# Patient Record
Sex: MALE | Race: WHITE | Employment: FULL TIME | URBAN - METROPOLITAN AREA
[De-identification: names, ages, dates, MRNs, and addresses within clinical notes are randomized per-mention and may not be internally consistent; named-entity substitution may affect disease eponyms.]

---

## 2017-12-05 ENCOUNTER — APPOINTMENT (OUTPATIENT)
Dept: GENERAL RADIOLOGY | Facility: CLINIC | Age: 47
End: 2017-12-05
Payer: COMMERCIAL

## 2017-12-05 ENCOUNTER — HOSPITAL ENCOUNTER (EMERGENCY)
Facility: CLINIC | Age: 47
Discharge: HOME | End: 2017-12-05
Attending: SPECIALIST
Payer: COMMERCIAL

## 2017-12-05 VITALS
HEART RATE: 88 BPM | DIASTOLIC BLOOD PRESSURE: 112 MMHG | SYSTOLIC BLOOD PRESSURE: 154 MMHG | WEIGHT: 200 LBS | OXYGEN SATURATION: 97 % | BODY MASS INDEX: 29.62 KG/M2 | TEMPERATURE: 98.8 F | RESPIRATION RATE: 18 BRPM | HEIGHT: 69 IN

## 2017-12-05 DIAGNOSIS — S86.001A INJURY OF RIGHT ACHILLES TENDON, INITIAL ENCOUNTER: Primary | ICD-10-CM

## 2017-12-05 PROCEDURE — 6370000000 HC RX 637 (ALT 250 FOR IP): Performed by: SPECIALIST

## 2017-12-05 PROCEDURE — 99283 EMERGENCY DEPT VISIT LOW MDM: CPT

## 2017-12-05 PROCEDURE — 29515 APPLICATION SHORT LEG SPLINT: CPT

## 2017-12-05 PROCEDURE — 73590 X-RAY EXAM OF LOWER LEG: CPT

## 2017-12-05 RX ORDER — IBUPROFEN 800 MG/1
800 TABLET ORAL EVERY 8 HOURS PRN
Qty: 20 TABLET | Refills: 0 | Status: SHIPPED | OUTPATIENT
Start: 2017-12-05 | End: 2022-08-15 | Stop reason: SDUPTHER

## 2017-12-05 RX ORDER — ACETAMINOPHEN 325 MG/1
650 TABLET ORAL ONCE
Status: COMPLETED | OUTPATIENT
Start: 2017-12-05 | End: 2017-12-05

## 2017-12-05 RX ORDER — IBUPROFEN 600 MG/1
600 TABLET ORAL ONCE
Status: COMPLETED | OUTPATIENT
Start: 2017-12-05 | End: 2017-12-05

## 2017-12-05 RX ADMIN — ACETAMINOPHEN 650 MG: 325 TABLET ORAL at 09:46

## 2017-12-05 RX ADMIN — IBUPROFEN 600 MG: 600 TABLET, FILM COATED ORAL at 09:46

## 2017-12-05 ASSESSMENT — PAIN DESCRIPTION - ORIENTATION: ORIENTATION: RIGHT

## 2017-12-05 ASSESSMENT — PAIN DESCRIPTION - FREQUENCY: FREQUENCY: CONTINUOUS

## 2017-12-05 ASSESSMENT — PAIN DESCRIPTION - ONSET: ONSET: SUDDEN

## 2017-12-05 ASSESSMENT — PAIN SCALES - WONG BAKER: WONGBAKER_NUMERICALRESPONSE: 10

## 2017-12-05 ASSESSMENT — PAIN DESCRIPTION - PROGRESSION: CLINICAL_PROGRESSION: GRADUALLY WORSENING

## 2017-12-05 ASSESSMENT — PAIN DESCRIPTION - PAIN TYPE: TYPE: ACUTE PAIN

## 2017-12-05 ASSESSMENT — PAIN DESCRIPTION - DESCRIPTORS: DESCRIPTORS: THROBBING

## 2017-12-05 ASSESSMENT — PAIN SCALES - GENERAL: PAINLEVEL_OUTOF10: 7

## 2017-12-05 NOTE — ED NOTES
Calling Dr. Nikhil Rosario (on call for orthopedics) [phone]. Speaking over the phone at this time.      Merna Solorio RN  12/05/17 7828

## 2017-12-05 NOTE — ED NOTES
Patient came back in wheelchair to ER. Patient states around 0715 am at work he was standing by a row of semi-tires that weigh 200lbs a piece when the keesha on the other end knocked them over and a frances affect occurred and landed on his right ankle area. Patient has significant swelling to the right achilles tendon. Patient is unable to ambulate and cannot flex his foot.      Shannan Dawson RN  12/05/17 1692

## 2017-12-05 NOTE — ED PROVIDER NOTES
surgical history that includes Tonsillectomy and Tonsillectomy. CURRENT MEDICATIONS       Discharge Medication List as of 12/5/2017 11:24 AM      CONTINUE these medications which have NOT CHANGED    Details   omeprazole (PRILOSEC) 20 MG capsule Take 40 mg by mouth daily      meclizine (ANTIVERT) 25 MG tablet Take 1 tablet by mouth 3 times daily as needed, Disp-20 tablet, R-0             ALLERGIES     is allergic to penicillins. FAMILY HISTORY     has no family status information on file. family history is not on file. SOCIAL HISTORY      reports that he has quit smoking. He has never used smokeless tobacco. He reports that he drinks about 1.8 oz of alcohol per week . He reports that he does not use drugs. PHYSICAL EXAM     INITIAL VITALS:  height is 5' 9\" (1.753 m) and weight is 200 lb (90.7 kg). His oral temperature is 98.8 °F (37.1 °C). His blood pressure is 154/112 (abnormal) and his pulse is 88. His respiration is 18 and oxygen saturation is 97%. Physical Exam   Constitutional: He is oriented to person, place, and time. He appears well-developed and well-nourished. HENT:   Head: Normocephalic and atraumatic. Nose: Nose normal.   Mouth/Throat: Oropharynx is clear and moist.   Eyes: EOM are normal. Pupils are equal, round, and reactive to light. Neck: Normal range of motion. Neck supple. Cardiovascular: Normal rate, regular rhythm, normal heart sounds and intact distal pulses. No murmur heard. Pulmonary/Chest: Effort normal and breath sounds normal. No respiratory distress. Abdominal: Soft. Bowel sounds are normal. He exhibits no distension. There is no tenderness. Musculoskeletal:        Right ankle: He exhibits decreased range of motion and swelling. He exhibits normal pulse. No lateral malleolus, no medial malleolus and no head of 5th metatarsal tenderness found. Achilles tendon exhibits pain and abnormal Hammer's test results.    Neurological: He is alert and oriented to department course, ice packs were applied locally and the patient was given Tylenol 650 mg and Motrin 600 mg orally. Patient states he is feeling much better and able to move his ankle somewhat more freely. He continues to have limited plantar flexion with [place] test.  Posterior short leg splint was applied with the foot in plantar flexion. Please see procedure note. Crutches were supplied. I discussed the case with Dr. Rl Chaves, orthopedic surgeon on call and the patient was advised to follow-up with Dr. Rl Chaves in 2 days. He is advised to call [phone] for appointment. He is advised to return if worse. I have reviewed the disposition diagnosis with the patient and or their family/guardian. I have answered their questions and given discharge instructions. They voiced understanding of these instructions and did not have any further questions or complaints. Re-evaluation Notes    Upon reevaluation, patient is resting comfortably and does not appear to be in any pain or distress. PROCEDURES:  Splint Application  Date/Time: 12/6/2017 8:54 AM  Performed by: Eveline Reyna  Authorized by: Eveline Reyna     Consent:     Consent obtained:  Verbal    Consent given by:  Patient    Risks discussed:  Discoloration and numbness    Alternatives discussed:  No treatment, alternative treatment, observation and referral  Pre-procedure details:     Sensation:  Normal  Procedure details:     Laterality:  Right    Location:  Ankle    Ankle:  R ankle    Strapping: yes      Splint type:  Short leg    Supplies:  Cotton padding, elastic bandage and Ortho-Glass  Post-procedure details:     Pain:  Improved    Sensation:  Normal    Patient tolerance of procedure: Tolerated well, no immediate complications        FINAL IMPRESSION      1.  Injury of right Achilles tendon, initial encounter          DISPOSITION/PLAN   DISPOSITION Decision to Discharge    Condition on Disposition    Stable    PATIENT REFERRED TO:  Dionisio Davis

## 2017-12-06 ASSESSMENT — ENCOUNTER SYMPTOMS: BACK PAIN: 0

## 2022-01-31 ENCOUNTER — HOSPITAL ENCOUNTER (OUTPATIENT)
Age: 52
Setting detail: SPECIMEN
Discharge: HOME | End: 2022-01-31

## 2022-01-31 ENCOUNTER — OFFICE VISIT (OUTPATIENT)
Dept: FAMILY MEDICINE CLINIC | Age: 52
End: 2022-01-31
Payer: COMMERCIAL

## 2022-01-31 VITALS
BODY MASS INDEX: 30.16 KG/M2 | OXYGEN SATURATION: 96 % | RESPIRATION RATE: 20 BRPM | HEIGHT: 69 IN | HEART RATE: 83 BPM | SYSTOLIC BLOOD PRESSURE: 138 MMHG | DIASTOLIC BLOOD PRESSURE: 88 MMHG | WEIGHT: 203.6 LBS

## 2022-01-31 DIAGNOSIS — Z13.29 SCREENING FOR THYROID DISORDER: ICD-10-CM

## 2022-01-31 DIAGNOSIS — Z13.220 SCREENING FOR HYPERLIPIDEMIA: ICD-10-CM

## 2022-01-31 DIAGNOSIS — F12.90 MARIJUANA USE: ICD-10-CM

## 2022-01-31 DIAGNOSIS — Z11.4 SCREENING FOR HIV (HUMAN IMMUNODEFICIENCY VIRUS): ICD-10-CM

## 2022-01-31 DIAGNOSIS — Z13.0 SCREENING FOR DEFICIENCY ANEMIA: ICD-10-CM

## 2022-01-31 DIAGNOSIS — Z76.89 ENCOUNTER TO ESTABLISH CARE: Primary | ICD-10-CM

## 2022-01-31 DIAGNOSIS — I10 HYPERTENSION, UNSPECIFIED TYPE: ICD-10-CM

## 2022-01-31 DIAGNOSIS — Z12.5 SCREENING FOR PROSTATE CANCER: ICD-10-CM

## 2022-01-31 DIAGNOSIS — E11.9 TYPE 2 DIABETES MELLITUS WITHOUT COMPLICATION, WITHOUT LONG-TERM CURRENT USE OF INSULIN (HCC): ICD-10-CM

## 2022-01-31 DIAGNOSIS — Z11.59 ENCOUNTER FOR HEPATITIS C SCREENING TEST FOR LOW RISK PATIENT: ICD-10-CM

## 2022-01-31 DIAGNOSIS — K21.9 GASTROESOPHAGEAL REFLUX DISEASE WITHOUT ESOPHAGITIS: ICD-10-CM

## 2022-01-31 LAB
ABSOLUTE EOS #: 0.23 K/UL (ref 0–0.44)
ABSOLUTE IMMATURE GRANULOCYTE: 0.03 K/UL (ref 0–0.3)
ABSOLUTE LYMPH #: 2.17 K/UL (ref 1.1–3.7)
ABSOLUTE MONO #: 0.68 K/UL (ref 0.1–1.2)
ALBUMIN SERPL-MCNC: 4.8 G/DL (ref 3.5–5.2)
ALBUMIN/GLOBULIN RATIO: 2 (ref 1–2.5)
ALP BLD-CCNC: 47 U/L (ref 40–129)
ALT SERPL-CCNC: 23 U/L (ref 5–41)
ANION GAP SERPL CALCULATED.3IONS-SCNC: 14 MMOL/L (ref 9–17)
AST SERPL-CCNC: 17 U/L
BASOPHILS # BLD: 1 % (ref 0–2)
BASOPHILS ABSOLUTE: 0.06 K/UL (ref 0–0.2)
BILIRUB SERPL-MCNC: <0.1 MG/DL (ref 0.3–1.2)
BUN BLDV-MCNC: 19 MG/DL (ref 6–20)
BUN/CREAT BLD: ABNORMAL (ref 9–20)
CALCIUM SERPL-MCNC: 9.9 MG/DL (ref 8.6–10.4)
CHLORIDE BLD-SCNC: 100 MMOL/L (ref 98–107)
CHOLESTEROL/HDL RATIO: 3.2
CHOLESTEROL: 191 MG/DL
CO2: 25 MMOL/L (ref 20–31)
CREAT SERPL-MCNC: 0.7 MG/DL (ref 0.7–1.2)
DIFFERENTIAL TYPE: NORMAL
EOSINOPHILS RELATIVE PERCENT: 3 % (ref 1–4)
GFR AFRICAN AMERICAN: >60 ML/MIN
GFR NON-AFRICAN AMERICAN: >60 ML/MIN
GFR SERPL CREATININE-BSD FRML MDRD: ABNORMAL ML/MIN/{1.73_M2}
GFR SERPL CREATININE-BSD FRML MDRD: ABNORMAL ML/MIN/{1.73_M2}
GLUCOSE BLD-MCNC: 85 MG/DL (ref 70–99)
HBA1C MFR BLD: 5.5 %
HCT VFR BLD CALC: 46.9 % (ref 40.7–50.3)
HDLC SERPL-MCNC: 60 MG/DL
HEMOGLOBIN: 15.6 G/DL (ref 13–17)
HEPATITIS C ANTIBODY: NONREACTIVE
HIV AG/AB: NONREACTIVE
IMMATURE GRANULOCYTES: 0 %
LDL CHOLESTEROL: 86 MG/DL (ref 0–130)
LYMPHOCYTES # BLD: 29 % (ref 24–43)
MCH RBC QN AUTO: 28.8 PG (ref 25.2–33.5)
MCHC RBC AUTO-ENTMCNC: 33.3 G/DL (ref 28.4–34.8)
MCV RBC AUTO: 86.5 FL (ref 82.6–102.9)
MONOCYTES # BLD: 9 % (ref 3–12)
NRBC AUTOMATED: 0 PER 100 WBC
PDW BLD-RTO: 12.7 % (ref 11.8–14.4)
PLATELET # BLD: 193 K/UL (ref 138–453)
PLATELET ESTIMATE: NORMAL
PMV BLD AUTO: 12.7 FL (ref 8.1–13.5)
POTASSIUM SERPL-SCNC: 4.5 MMOL/L (ref 3.7–5.3)
PROSTATE SPECIFIC ANTIGEN: 1.35 UG/L
RBC # BLD: 5.42 M/UL (ref 4.21–5.77)
RBC # BLD: NORMAL 10*6/UL
SEG NEUTROPHILS: 58 % (ref 36–65)
SEGMENTED NEUTROPHILS ABSOLUTE COUNT: 4.31 K/UL (ref 1.5–8.1)
SODIUM BLD-SCNC: 139 MMOL/L (ref 135–144)
TOTAL PROTEIN: 7.2 G/DL (ref 6.4–8.3)
TRIGL SERPL-MCNC: 223 MG/DL
TSH SERPL DL<=0.05 MIU/L-ACNC: 0.45 MIU/L (ref 0.3–5)
VLDLC SERPL CALC-MCNC: ABNORMAL MG/DL (ref 1–30)
WBC # BLD: 7.5 K/UL (ref 3.5–11.3)
WBC # BLD: NORMAL 10*3/UL

## 2022-01-31 PROCEDURE — 83036 HEMOGLOBIN GLYCOSYLATED A1C: CPT | Performed by: NURSE PRACTITIONER

## 2022-01-31 PROCEDURE — 99203 OFFICE O/P NEW LOW 30 MIN: CPT | Performed by: NURSE PRACTITIONER

## 2022-01-31 RX ORDER — OMEPRAZOLE 20 MG/1
20 CAPSULE, DELAYED RELEASE ORAL DAILY
COMMUNITY

## 2022-01-31 RX ORDER — VALACYCLOVIR HYDROCHLORIDE 1 G/1
1000 TABLET, FILM COATED ORAL 2 TIMES DAILY
COMMUNITY

## 2022-01-31 RX ORDER — LISINOPRIL 10 MG/1
10 TABLET ORAL DAILY
Qty: 30 TABLET | Refills: 1 | Status: SHIPPED | OUTPATIENT
Start: 2022-01-31 | End: 2022-02-28 | Stop reason: SDUPTHER

## 2022-01-31 SDOH — ECONOMIC STABILITY: FOOD INSECURITY: WITHIN THE PAST 12 MONTHS, THE FOOD YOU BOUGHT JUST DIDN'T LAST AND YOU DIDN'T HAVE MONEY TO GET MORE.: NEVER TRUE

## 2022-01-31 SDOH — ECONOMIC STABILITY: FOOD INSECURITY: WITHIN THE PAST 12 MONTHS, YOU WORRIED THAT YOUR FOOD WOULD RUN OUT BEFORE YOU GOT MONEY TO BUY MORE.: NEVER TRUE

## 2022-01-31 ASSESSMENT — ENCOUNTER SYMPTOMS
COUGH: 1
SINUS PAIN: 0
COLOR CHANGE: 0
SINUS PRESSURE: 0
BACK PAIN: 0
DIARRHEA: 0
NAUSEA: 0
EYE PAIN: 0
SHORTNESS OF BREATH: 1
CHEST TIGHTNESS: 0
ABDOMINAL PAIN: 0
CONSTIPATION: 0
VOMITING: 0

## 2022-01-31 ASSESSMENT — COLUMBIA-SUICIDE SEVERITY RATING SCALE - C-SSRS
6. HAVE YOU EVER DONE ANYTHING, STARTED TO DO ANYTHING, OR PREPARED TO DO ANYTHING TO END YOUR LIFE?: NO
1. WITHIN THE PAST MONTH, HAVE YOU WISHED YOU WERE DEAD OR WISHED YOU COULD GO TO SLEEP AND NOT WAKE UP?: NO
2. HAVE YOU ACTUALLY HAD ANY THOUGHTS OF KILLING YOURSELF?: NO

## 2022-01-31 ASSESSMENT — SOCIAL DETERMINANTS OF HEALTH (SDOH): HOW HARD IS IT FOR YOU TO PAY FOR THE VERY BASICS LIKE FOOD, HOUSING, MEDICAL CARE, AND HEATING?: NOT HARD AT ALL

## 2022-01-31 ASSESSMENT — PATIENT HEALTH QUESTIONNAIRE - PHQ9
1. LITTLE INTEREST OR PLEASURE IN DOING THINGS: 0
2. FEELING DOWN, DEPRESSED OR HOPELESS: 0
SUM OF ALL RESPONSES TO PHQ QUESTIONS 1-9: 0
SUM OF ALL RESPONSES TO PHQ9 QUESTIONS 1 & 2: 0

## 2022-01-31 NOTE — ASSESSMENT & PLAN NOTE
Unclear control, changes made today: start lisinopril, medication adherence emphasized and lifestyle modifications recommended Follow up in 1 month. Maintain a healthy weight and BMI, incorporate regular aerobic exercise and focus on stress management. Adhere to medication regimen, reduce dietary sodium and saturated fat intake - increase fruits, vegetables and whole grains to diet. When self-monitoring blood pressure use reliable instruments; measure blood pressure at least once a week and after any changes made to medication therapy. Bring in an average of home readings on two or more occasions for office visits.

## 2022-01-31 NOTE — PROGRESS NOTES
Henriette Frankel (:  1970) is a 54 y.o. male,New patient, here for evaluation of the following chief complaint(s):  New Patient and Health Maintenance ([facility] will get records)         ASSESSMENT/PLAN:  1. Encounter to establish care  Comments:  Rx for annual labs - follow up pending results  2. Type 2 diabetes mellitus without complication, without long-term current use of insulin (HCC)  Assessment & Plan:   Well-controlled, continue current medications, medication adherence emphasized and lifestyle modifications recommended  Orders:  -     Comprehensive Metabolic Panel; Future  -     POCT glycosylated hemoglobin (Hb A1C)  3. Hypertension, unspecified type  Assessment & Plan:   Unclear control, changes made today: start lisinopril, medication adherence emphasized and lifestyle modifications recommended Follow up in 1 month. Maintain a healthy weight and BMI, incorporate regular aerobic exercise and focus on stress management. Adhere to medication regimen, reduce dietary sodium and saturated fat intake - increase fruits, vegetables and whole grains to diet. When self-monitoring blood pressure use reliable instruments; measure blood pressure at least once a week and after any changes made to medication therapy. Bring in an average of home readings on two or more occasions for office visits. Orders:  -     lisinopril (PRINIVIL;ZESTRIL) 10 MG tablet; Take 1 tablet by mouth daily, Disp-30 tablet, R-1Normal  4. Screening for HIV (human immunodeficiency virus)  -     HIV Screen; Future  5. Encounter for hepatitis C screening test for low risk patient  -     Hepatitis C Antibody; Future  6. Screening for thyroid disorder  -     TSH With Reflex Ft4; Future  7. Screening for deficiency anemia  -     CBC With Auto Differential; Future  8. Screening for hyperlipidemia  -     Lipid Panel; Future  9. Screening for prostate cancer  -     PSA Screening; Future  10.  Gastroesophageal reflux tightness. Cardiovascular: Negative for chest pain and palpitations. Gastrointestinal: Negative for abdominal pain, constipation, diarrhea, nausea and vomiting. Endocrine: Negative for polydipsia, polyphagia and polyuria. Genitourinary: Negative for dysuria, flank pain, frequency and urgency. Musculoskeletal: Negative for arthralgias, back pain and myalgias. Skin: Negative for color change and rash. Neurological: Negative for dizziness, weakness, light-headedness, numbness and headaches. Psychiatric/Behavioral: Negative for confusion, hallucinations, self-injury, sleep disturbance and suicidal ideas. The patient is not nervous/anxious. Objective   Physical Exam  Vitals and nursing note reviewed. Constitutional:       Appearance: Normal appearance. HENT:      Head: Normocephalic. Right Ear: Tympanic membrane normal.      Left Ear: Tympanic membrane normal.      Nose: Nose normal.      Mouth/Throat:      Mouth: Mucous membranes are moist.      Pharynx: Oropharynx is clear. Eyes:      Extraocular Movements: Extraocular movements intact. Conjunctiva/sclera: Conjunctivae normal.      Pupils: Pupils are equal, round, and reactive to light. Neck:      Thyroid: No thyroid mass. Cardiovascular:      Rate and Rhythm: Normal rate and regular rhythm. Pulses: Normal pulses. Heart sounds: Normal heart sounds, S1 normal and S2 normal. Heart sounds not distant. No murmur heard. Pulmonary:      Effort: Pulmonary effort is normal.      Breath sounds: Normal breath sounds and air entry. Abdominal:      General: Abdomen is flat. Bowel sounds are normal.      Palpations: Abdomen is soft. Musculoskeletal:         General: Normal range of motion. Cervical back: Normal range of motion. Lymphadenopathy:      Cervical: No cervical adenopathy. Skin:     General: Skin is warm and dry. Capillary Refill: Capillary refill takes less than 2 seconds.    Neurological: General: No focal deficit present. Mental Status: He is alert and oriented to person, place, and time. Mental status is at baseline. Psychiatric:         Mood and Affect: Mood normal.         Behavior: Behavior normal.         Thought Content: Thought content normal.         Judgment: Judgment normal.                Wt Readings from Last 3 Encounters:   01/31/22 203 lb 9.6 oz (92.4 kg)   12/05/17 200 lb (90.7 kg)     Temp Readings from Last 3 Encounters:   12/05/17 98.8 °F (37.1 °C) (Oral)   02/29/16 98.2 °F (36.8 °C) (Oral)     BP Readings from Last 3 Encounters:   01/31/22 138/88   12/05/17 (!) 154/112   02/29/16 142/74     Pulse Readings from Last 3 Encounters:   01/31/22 83   12/05/17 88   02/29/16 73         An electronic signature was used to authenticate this note.     --LEXY Cherry - NP

## 2022-02-02 DIAGNOSIS — E78.5 HYPERLIPIDEMIA, UNSPECIFIED HYPERLIPIDEMIA TYPE: Primary | ICD-10-CM

## 2022-02-02 RX ORDER — ATORVASTATIN CALCIUM 10 MG/1
10 TABLET, FILM COATED ORAL DAILY
Qty: 30 TABLET | Refills: 5 | Status: SHIPPED | OUTPATIENT
Start: 2022-02-02 | End: 2022-02-28 | Stop reason: ALTCHOICE

## 2022-02-11 NOTE — TELEPHONE ENCOUNTER
Last visit: 1/31/22  Last Med refill: historic med-from previous pcp  Does patient have enough medication for 72 hours: No:     Next Visit Date:  Future Appointments   Date Time Provider Claus Clarke   2/28/2022  2:15 PM LEXY Conley -  [facility]   Topic Date Due    Pneumococcal 0-64 years Vaccine (1 of 2 - PPSV23) Never done    Diabetic foot exam  Never done    Diabetic microalbuminuria test  Never done    Diabetic retinal exam  Never done    Hepatitis B vaccine (1 of 3 - Risk 3-dose series) Never done    DTaP/Tdap/Td vaccine (1 - Tdap) Never done    Colon cancer screen colonoscopy  Never done    COVID-19 Vaccine (2 - Booster for Bountii series) 07/09/2021    Flu vaccine (1) Never done    A1C test (Diabetic or Prediabetic)  01/31/2023    Lipid screen  01/31/2023    Depression Screen  01/31/2023    Potassium monitoring  01/31/2023    Creatinine monitoring  01/31/2023    Hepatitis C screen  Completed    HIV screen  Completed    Hepatitis A vaccine  Aged Out    Hib vaccine  Aged Out    Meningococcal (ACWY) vaccine  Aged Out       Hemoglobin A1C (%)   Date Value   01/31/2022 5.5             ( goal A1C is < 7)   No results found for: LABMICR  LDL Cholesterol (mg/dL)   Date Value   01/31/2022 86       (goal LDL is <100)   AST (U/L)   Date Value   01/31/2022 17     ALT (U/L)   Date Value   01/31/2022 23     BUN (mg/dL)   Date Value   01/31/2022 19     BP Readings from Last 3 Encounters:   01/31/22 138/88   12/05/17 (!) 154/112   02/29/16 142/74          (goal 120/80)    All Future Testing planned in [facility]  Lab Frequency Next Occurrence   Hepatic Function Panel Once 08/02/2022   Lipid Panel Once 08/02/2022               Patient Active Problem List:     Type 2 diabetes mellitus without complication, without long-term current use of insulin (HCC)     Marijuana use     Hypertension     Gastroesophageal reflux disease without esophagitis

## 2022-02-16 ENCOUNTER — TELEPHONE (OUTPATIENT)
Dept: FAMILY MEDICINE CLINIC | Age: 52
End: 2022-02-16

## 2022-02-16 NOTE — TELEPHONE ENCOUNTER
----- Message from Morris Peterson sent at 2/16/2022  3:20 PM EST -----  Subject: Refill Request    QUESTIONS  Name of Medication? metFORMIN (GLUCOPHAGE) 500 MG tablet  Patient-reported dosage and instructions? Take 1 tablet by mouth daily   (with breakfast)  How many days do you have left? 0  Preferred Pharmacy? [address] [address]  Pharmacy phone number (if available)? [phone]  ---------------------------------------------------------------------------  --------------  CALL BACK INFO  What is the best way for the office to contact you? OK to leave message on   voicemail  Preferred Call Back Phone Number?  [phone]

## 2022-02-28 ENCOUNTER — OFFICE VISIT (OUTPATIENT)
Dept: FAMILY MEDICINE CLINIC | Age: 52
End: 2022-02-28
Payer: COMMERCIAL

## 2022-02-28 VITALS
BODY MASS INDEX: 29.92 KG/M2 | RESPIRATION RATE: 20 BRPM | DIASTOLIC BLOOD PRESSURE: 82 MMHG | SYSTOLIC BLOOD PRESSURE: 132 MMHG | HEIGHT: 69 IN | HEART RATE: 78 BPM | OXYGEN SATURATION: 96 % | WEIGHT: 202 LBS

## 2022-02-28 DIAGNOSIS — E78.5 HYPERLIPIDEMIA, UNSPECIFIED HYPERLIPIDEMIA TYPE: ICD-10-CM

## 2022-02-28 DIAGNOSIS — I10 HYPERTENSION, UNSPECIFIED TYPE: Primary | ICD-10-CM

## 2022-02-28 PROCEDURE — 99213 OFFICE O/P EST LOW 20 MIN: CPT | Performed by: NURSE PRACTITIONER

## 2022-02-28 RX ORDER — LISINOPRIL 10 MG/1
10 TABLET ORAL DAILY
Qty: 90 TABLET | Refills: 1 | Status: SHIPPED | OUTPATIENT
Start: 2022-02-28 | End: 2022-08-29 | Stop reason: SDUPTHER

## 2022-02-28 RX ORDER — ROSUVASTATIN CALCIUM 10 MG/1
10 TABLET, COATED ORAL DAILY
Qty: 90 TABLET | Refills: 1 | Status: SHIPPED | OUTPATIENT
Start: 2022-02-28 | End: 2022-08-29 | Stop reason: SDUPTHER

## 2022-02-28 ASSESSMENT — ENCOUNTER SYMPTOMS
ABDOMINAL PAIN: 0
SHORTNESS OF BREATH: 0
BLURRED VISION: 0
CHEST TIGHTNESS: 0
CONSTIPATION: 0
COLOR CHANGE: 0
NAUSEA: 0
EYE PAIN: 0
SINUS PAIN: 0
ORTHOPNEA: 0
DIARRHEA: 0
VOMITING: 0
BACK PAIN: 0
SINUS PRESSURE: 0

## 2022-02-28 NOTE — PROGRESS NOTES
Raul Nava (:  1970) is a 54 y.o. male,Established patient, here for evaluation of the following chief complaint(s):  Hypertension and Generalized Body Aches (after taking lipitor)         ASSESSMENT/PLAN:  1. Hypertension, unspecified type  Assessment & Plan:   Well-controlled, continue current medications   Repeat BMP in 3 months. Orders:  -     Basic Metabolic Panel; Future  -     lisinopril (PRINIVIL;ZESTRIL) 10 MG tablet; Take 1 tablet by mouth daily, Disp-90 tablet, R-1Normal  2. Hyperlipidemia, unspecified hyperlipidemia type  Comments:  Stop lipitor, start crestor. Should myalgias continue or worsen please contact office for further evaluation. Orders:  -     rosuvastatin (CRESTOR) 10 MG tablet; Take 1 tablet by mouth daily, Disp-90 tablet, R-1Normal      Return in about 6 months (around 2022) for 6 month follow up. Subjective   SUBJECTIVE/OBJECTIVE:  Presents for 1 month recheck of HTN    HTN: BP log presented to review  Average readings 110-130/70-80s  Reports feeling better overall - notices he feels like he can take a more full breath when working hard  Does not feel his kidneys aching anymore     Statin: feels a little achy in the morning since starting lipitor  Reports after his first cup of coffee those muscle aches go away   Would like to continue statin therapy but try another medication     Denies any other problems/concerns at this time     Hypertension  This is a chronic problem. The current episode started more than 1 year ago. The problem has been gradually improving since onset. The problem is controlled. Pertinent negatives include no blurred vision, chest pain, headaches, orthopnea, palpitations, peripheral edema or shortness of breath. Agents associated with hypertension include NSAIDs. Risk factors for coronary artery disease include diabetes mellitus, dyslipidemia, obesity and stress. Past treatments include ACE inhibitors.  The current treatment provides significant improvement. There are no compliance problems. There is no history of kidney disease. Identifiable causes of hypertension include a hypertension causing med. Generalized Body Aches  Associated symptoms include arthralgias and myalgias. Pertinent negatives include no abdominal pain, chest pain, chills, fatigue, headaches, nausea, numbness, rash, vomiting or weakness. Review of Systems   Constitutional: Negative for activity change, chills, fatigue and unexpected weight change. HENT: Negative for hearing loss, postnasal drip, sinus pressure and sinus pain. Eyes: Negative for blurred vision, pain and visual disturbance. Respiratory: Negative for chest tightness and shortness of breath. Cardiovascular: Negative for chest pain, palpitations and orthopnea. Gastrointestinal: Negative for abdominal pain, constipation, diarrhea, nausea and vomiting. Endocrine: Negative for polydipsia, polyphagia and polyuria. Genitourinary: Negative for dysuria, flank pain, frequency and urgency. Musculoskeletal: Positive for arthralgias and myalgias. Negative for back pain. Skin: Negative for color change and rash. Neurological: Negative for dizziness, weakness, light-headedness, numbness and headaches. Psychiatric/Behavioral: Negative for confusion, hallucinations, self-injury, sleep disturbance and suicidal ideas. The patient is not nervous/anxious. Objective   Physical Exam  Vitals and nursing note reviewed. Constitutional:       Appearance: Normal appearance. HENT:      Head: Normocephalic. Right Ear: Tympanic membrane normal.      Left Ear: Tympanic membrane normal.      Nose: Nose normal.      Mouth/Throat:      Mouth: Mucous membranes are moist.      Pharynx: Oropharynx is clear. Eyes:      Extraocular Movements: Extraocular movements intact. Conjunctiva/sclera: Conjunctivae normal.      Pupils: Pupils are equal, round, and reactive to light.    Cardiovascular:

## 2022-08-15 RX ORDER — IBUPROFEN 800 MG/1
800 TABLET ORAL EVERY 8 HOURS PRN
Qty: 20 TABLET | Refills: 0 | Status: SHIPPED | OUTPATIENT
Start: 2022-08-15 | End: 2026-10-11

## 2022-08-15 NOTE — TELEPHONE ENCOUNTER
Patient states he does not take these daily, just when his arthritis flares up. Pt has not had a refill with us yet. Pts next appt is 8/29/22. Pt advised to get his blood work completed before then.

## 2022-08-29 ENCOUNTER — OFFICE VISIT (OUTPATIENT)
Dept: FAMILY MEDICINE CLINIC | Age: 52
End: 2022-08-29
Payer: COMMERCIAL

## 2022-08-29 VITALS
HEART RATE: 71 BPM | BODY MASS INDEX: 29.31 KG/M2 | SYSTOLIC BLOOD PRESSURE: 116 MMHG | OXYGEN SATURATION: 98 % | WEIGHT: 198.5 LBS | DIASTOLIC BLOOD PRESSURE: 72 MMHG | TEMPERATURE: 98.2 F

## 2022-08-29 DIAGNOSIS — E78.5 HYPERLIPIDEMIA, UNSPECIFIED HYPERLIPIDEMIA TYPE: ICD-10-CM

## 2022-08-29 DIAGNOSIS — E11.9 TYPE 2 DIABETES MELLITUS WITHOUT COMPLICATION, WITHOUT LONG-TERM CURRENT USE OF INSULIN (HCC): Primary | ICD-10-CM

## 2022-08-29 DIAGNOSIS — I10 HYPERTENSION, UNSPECIFIED TYPE: ICD-10-CM

## 2022-08-29 LAB — HBA1C MFR BLD: 5.5 %

## 2022-08-29 PROCEDURE — 99214 OFFICE O/P EST MOD 30 MIN: CPT | Performed by: NURSE PRACTITIONER

## 2022-08-29 PROCEDURE — 3044F HG A1C LEVEL LT 7.0%: CPT | Performed by: NURSE PRACTITIONER

## 2022-08-29 PROCEDURE — 83036 HEMOGLOBIN GLYCOSYLATED A1C: CPT | Performed by: NURSE PRACTITIONER

## 2022-08-29 RX ORDER — LISINOPRIL 10 MG/1
10 TABLET ORAL DAILY
Qty: 90 TABLET | Refills: 1 | Status: SHIPPED | OUTPATIENT
Start: 2022-08-29

## 2022-08-29 RX ORDER — ROSUVASTATIN CALCIUM 10 MG/1
10 TABLET, COATED ORAL DAILY
Qty: 90 TABLET | Refills: 1 | Status: SHIPPED | OUTPATIENT
Start: 2022-08-29

## 2022-08-29 ASSESSMENT — ENCOUNTER SYMPTOMS
COLOR CHANGE: 0
CHEST TIGHTNESS: 0
BACK PAIN: 0
DIARRHEA: 0
SINUS PAIN: 0
SINUS PRESSURE: 0
VOMITING: 0
NAUSEA: 0
EYE PAIN: 0
SHORTNESS OF BREATH: 0
CONSTIPATION: 0
ABDOMINAL PAIN: 0

## 2022-08-29 NOTE — PROGRESS NOTES
artery disease include diabetes mellitus, obesity, dyslipidemia and male gender. Past treatments include ACE inhibitors. The current treatment provides significant improvement. There are no compliance problems. There is no history of kidney disease. Identifiable causes of hypertension include a hypertension causing med. Review of Systems   Constitutional:  Negative for activity change, chills, fatigue and unexpected weight change. HENT:  Negative for hearing loss, postnasal drip, sinus pressure and sinus pain. Eyes:  Negative for pain and visual disturbance. Respiratory:  Negative for chest tightness and shortness of breath. Cardiovascular:  Negative for chest pain and palpitations. Gastrointestinal:  Negative for abdominal pain, constipation, diarrhea, nausea and vomiting. Endocrine: Negative for polydipsia, polyphagia and polyuria. Genitourinary:  Negative for dysuria, flank pain, frequency and urgency. Musculoskeletal:  Negative for arthralgias, back pain and myalgias. Skin:  Negative for color change and rash. Neurological:  Negative for dizziness, weakness, light-headedness, numbness and headaches. Psychiatric/Behavioral:  Negative for confusion, hallucinations, self-injury, sleep disturbance and suicidal ideas. The patient is not nervous/anxious. Objective   Physical Exam  Vitals and nursing note reviewed. Constitutional:       Appearance: Normal appearance. HENT:      Head: Normocephalic. Right Ear: Hearing normal.      Left Ear: Hearing normal.      Nose: Nose normal.      Mouth/Throat:      Mouth: Mucous membranes are moist.      Pharynx: Oropharynx is clear. Eyes:      Extraocular Movements: Extraocular movements intact. Conjunctiva/sclera: Conjunctivae normal.      Pupils: Pupils are equal, round, and reactive to light. Cardiovascular:      Rate and Rhythm: Normal rate and regular rhythm. Pulses: Normal pulses.       Heart sounds: Normal heart sounds. Pulmonary:      Effort: Pulmonary effort is normal.      Breath sounds: Normal breath sounds. Abdominal:      General: Abdomen is flat. Bowel sounds are normal.      Palpations: Abdomen is soft. Musculoskeletal:         General: Normal range of motion. Cervical back: Normal range of motion. Skin:     General: Skin is warm and dry. Capillary Refill: Capillary refill takes less than 2 seconds. Neurological:      General: No focal deficit present. Mental Status: He is alert and oriented to person, place, and time. Mental status is at baseline. Psychiatric:         Mood and Affect: Mood normal.         Behavior: Behavior normal.         Thought Content: Thought content normal.         Judgment: Judgment normal.              Wt Readings from Last 3 Encounters:   08/29/22 198 lb 8 oz (90 kg)   02/28/22 202 lb (91.6 kg)   01/31/22 203 lb 9.6 oz (92.4 kg)     Temp Readings from Last 3 Encounters:   08/29/22 98.2 °F (36.8 °C)   12/05/17 98.8 °F (37.1 °C) (Oral)   02/29/16 98.2 °F (36.8 °C) (Oral)     BP Readings from Last 3 Encounters:   08/29/22 116/72   02/28/22 132/82   01/31/22 138/88     Pulse Readings from Last 3 Encounters:   08/29/22 71   02/28/22 78   01/31/22 83           An electronic signature was used to authenticate this note.     --LEXY Joseph NP

## 2022-12-19 ENCOUNTER — HOSPITAL ENCOUNTER (OUTPATIENT)
Age: 52
Setting detail: SPECIMEN
Discharge: HOME | End: 2022-12-19

## 2022-12-19 DIAGNOSIS — I10 HYPERTENSION, UNSPECIFIED TYPE: ICD-10-CM

## 2022-12-19 DIAGNOSIS — E78.5 HYPERLIPIDEMIA, UNSPECIFIED HYPERLIPIDEMIA TYPE: ICD-10-CM

## 2022-12-19 LAB
ALBUMIN SERPL-MCNC: 4.8 G/DL (ref 3.5–5.2)
ALBUMIN/GLOBULIN RATIO: 1.8 (ref 1–2.5)
ALP BLD-CCNC: 52 U/L (ref 40–129)
ALT SERPL-CCNC: 27 U/L (ref 5–41)
ANION GAP SERPL CALCULATED.3IONS-SCNC: 12 MMOL/L (ref 9–17)
AST SERPL-CCNC: 21 U/L
BILIRUB SERPL-MCNC: 0.5 MG/DL (ref 0.3–1.2)
BILIRUBIN DIRECT: 0.1 MG/DL
BILIRUBIN, INDIRECT: 0.4 MG/DL (ref 0–1)
BUN BLDV-MCNC: 19 MG/DL (ref 6–20)
CALCIUM SERPL-MCNC: 9.1 MG/DL (ref 8.6–10.4)
CHLORIDE BLD-SCNC: 102 MMOL/L (ref 98–107)
CHOLESTEROL/HDL RATIO: 2.1
CHOLESTEROL: 153 MG/DL
CO2: 26 MMOL/L (ref 20–31)
CREAT SERPL-MCNC: 0.71 MG/DL (ref 0.7–1.2)
GFR SERPL CREATININE-BSD FRML MDRD: >60 ML/MIN/1.73M2
GLUCOSE BLD-MCNC: 100 MG/DL (ref 70–99)
HDLC SERPL-MCNC: 72 MG/DL
LDL CHOLESTEROL: 60 MG/DL (ref 0–130)
POTASSIUM SERPL-SCNC: 4.2 MMOL/L (ref 3.7–5.3)
SODIUM BLD-SCNC: 140 MMOL/L (ref 135–144)
TOTAL PROTEIN: 7.4 G/DL (ref 6.4–8.3)
TRIGL SERPL-MCNC: 103 MG/DL

## 2023-02-15 DIAGNOSIS — E11.9 TYPE 2 DIABETES MELLITUS WITHOUT COMPLICATION, WITHOUT LONG-TERM CURRENT USE OF INSULIN (HCC): ICD-10-CM

## 2023-02-15 NOTE — TELEPHONE ENCOUNTER
Last visit: 8/29/22  Last Med refill: 8/29/22  Does patient have enough medication for 72 hours: No:     Next Visit Date:  Future Appointments   Date Time Provider Claus Clarke   3/1/2023  2:30 PM LEXY Burkett -  [facility]   Topic Date Due    Pneumococcal 0-64 years Vaccine (1 - PCV) Never done    Diabetic foot exam  Never done    Diabetic Alb to Cr ratio (uACR) test  Never done    Diabetic retinal exam  Never done    Hepatitis B vaccine (1 of 3 - Risk 3-dose series) Never done    DTaP/Tdap/Td vaccine (1 - Tdap) Never done    Colorectal Cancer Screen  Never done    COVID-19 Vaccine (2 - Booster for Brian series) 07/09/2021    Flu vaccine (1) Never done    Shingles vaccine (1 of 2) Never done    Depression Screen  01/31/2023    A1C test (Diabetic or Prediabetic)  08/29/2023    Lipids  12/19/2023    GFR test (Diabetes, CKD 3-4, OR last GFR 15-59)  12/19/2023    Hepatitis C screen  Completed    HIV screen  Completed    Hepatitis A vaccine  Aged Out    Hib vaccine  Aged Out    Meningococcal (ACWY) vaccine  Aged Out       Hemoglobin A1C (%)   Date Value   08/29/2022 5.5   01/31/2022 5.5             ( goal A1C is < 7)   No results found for: LABMICR  LDL Cholesterol (mg/dL)   Date Value   12/19/2022 60   01/31/2022 86       (goal LDL is <100)   AST (U/L)   Date Value   12/19/2022 21     ALT (U/L)   Date Value   12/19/2022 27     BUN (mg/dL)   Date Value   12/19/2022 19     BP Readings from Last 3 Encounters:   08/29/22 116/72   02/28/22 132/82   01/31/22 138/88          (goal 120/80)    All Future Testing planned in [facility]  Lab Frequency Next Occurrence   Microalbumin, Ur Once 08/29/2022               Patient Active Problem List:     Type 2 diabetes mellitus without complication, without long-term current use of insulin (HCC)     Marijuana use     Hypertension     Gastroesophageal reflux disease without esophagitis

## 2023-03-17 DIAGNOSIS — I10 HYPERTENSION, UNSPECIFIED TYPE: ICD-10-CM

## 2023-03-17 DIAGNOSIS — E78.5 HYPERLIPIDEMIA, UNSPECIFIED HYPERLIPIDEMIA TYPE: ICD-10-CM

## 2023-03-17 RX ORDER — LISINOPRIL 10 MG/1
TABLET ORAL
Qty: 90 TABLET | Refills: 0 | Status: SHIPPED | OUTPATIENT
Start: 2023-03-17

## 2023-03-17 RX ORDER — ROSUVASTATIN CALCIUM 10 MG/1
TABLET, COATED ORAL
Qty: 90 TABLET | Refills: 0 | Status: SHIPPED | OUTPATIENT
Start: 2023-03-17

## 2023-03-17 NOTE — TELEPHONE ENCOUNTER
Last visit: 08/29/2022  Last Med refill: 02/15/2023  Does patient have enough medication for 72 hours: No:     Next Visit Date:  No future appointments.     Health Maintenance   Topic Date Due    Pneumococcal 0-64 years Vaccine (1 - PCV) Never done    Diabetic foot exam  Never done    Diabetic Alb to Cr ratio (uACR) test  Never done    Diabetic retinal exam  Never done    Hepatitis B vaccine (1 of 3 - Risk 3-dose series) Never done    DTaP/Tdap/Td vaccine (1 - Tdap) Never done    Colorectal Cancer Screen  Never done    COVID-19 Vaccine (2 - Booster for Brian series) 07/09/2021    Flu vaccine (1) Never done    Shingles vaccine (1 of 2) Never done    Depression Screen  01/31/2023    A1C test (Diabetic or Prediabetic)  08/29/2023    Lipids  12/19/2023    GFR test (Diabetes, CKD 3-4, OR last GFR 15-59)  12/19/2023    Hepatitis C screen  Completed    HIV screen  Completed    Hepatitis A vaccine  Aged Out    Hib vaccine  Aged Out    Meningococcal (ACWY) vaccine  Aged Out       Hemoglobin A1C (%)   Date Value   08/29/2022 5.5   01/31/2022 5.5             ( goal A1C is < 7)   No results found for: LABMICR  LDL Cholesterol (mg/dL)   Date Value   12/19/2022 60   01/31/2022 86       (goal LDL is <100)   AST (U/L)   Date Value   12/19/2022 21     ALT (U/L)   Date Value   12/19/2022 27     BUN (mg/dL)   Date Value   12/19/2022 19     BP Readings from Last 3 Encounters:   08/29/22 116/72   02/28/22 132/82   01/31/22 138/88          (goal 120/80)    All Future Testing planned in [facility]  Lab Frequency Next Occurrence   Microalbumin, Ur Once 08/29/2022               Patient Active Problem List:     Type 2 diabetes mellitus without complication, without long-term current use of insulin (HCC)     Marijuana use     Hypertension     Gastroesophageal reflux disease without esophagitis

## 2023-05-24 ENCOUNTER — OFFICE VISIT (OUTPATIENT)
Dept: FAMILY MEDICINE CLINIC | Age: 53
End: 2023-05-24
Payer: COMMERCIAL

## 2023-05-24 VITALS
RESPIRATION RATE: 20 BRPM | BODY MASS INDEX: 28.91 KG/M2 | HEIGHT: 69 IN | SYSTOLIC BLOOD PRESSURE: 118 MMHG | OXYGEN SATURATION: 98 % | DIASTOLIC BLOOD PRESSURE: 72 MMHG | HEART RATE: 84 BPM | WEIGHT: 195.2 LBS

## 2023-05-24 DIAGNOSIS — R53.83 OTHER FATIGUE: ICD-10-CM

## 2023-05-24 DIAGNOSIS — E55.9 VITAMIN D DEFICIENCY: ICD-10-CM

## 2023-05-24 DIAGNOSIS — R06.02 SHORTNESS OF BREATH: ICD-10-CM

## 2023-05-24 DIAGNOSIS — E11.9 TYPE 2 DIABETES MELLITUS WITHOUT COMPLICATION, WITHOUT LONG-TERM CURRENT USE OF INSULIN (HCC): Primary | ICD-10-CM

## 2023-05-24 DIAGNOSIS — M25.50 ARTHRALGIA, UNSPECIFIED JOINT: ICD-10-CM

## 2023-05-24 DIAGNOSIS — Z12.5 SCREENING FOR PROSTATE CANCER: ICD-10-CM

## 2023-05-24 LAB
BASOPHILS ABSOLUTE: 0.05 /ΜL
BASOPHILS RELATIVE PERCENT: 0.5 %
EOSINOPHILS ABSOLUTE: 0.23 /ΜL
EOSINOPHILS RELATIVE PERCENT: 2.4 %
HBA1C MFR BLD: 5.8 %
HCT VFR BLD CALC: 44.7 % (ref 41–53)
HEMOGLOBIN: 15 G/DL (ref 13.5–17.5)
LYMPHOCYTES ABSOLUTE: 2.94 /ΜL
LYMPHOCYTES RELATIVE PERCENT: 31.1 %
MCH RBC QN AUTO: 29 PG
MCHC RBC AUTO-ENTMCNC: 33.6 G/DL
MCV RBC AUTO: 86.5 FL
MONOCYTES ABSOLUTE: 0.83 /ΜL
MONOCYTES RELATIVE PERCENT: 8.8 %
NEUTROPHILS ABSOLUTE: 5.36 /ΜL
NEUTROPHILS RELATIVE PERCENT: 56.9 %
PDW BLD-RTO: 13.2 %
PLATELET # BLD: 205 K/ΜL
PMV BLD AUTO: NORMAL FL
PROSTATE SPECIFIC ANTIGEN: 1.8 NG/ML
RBC # BLD: 5.17 10^6/ΜL
VITAMIN D 25-HYDROXY: 33.5
VITAMIN D2, 25 HYDROXY: NORMAL
VITAMIN D3,25 HYDROXY: NORMAL
WBC # BLD: 9.44 10^3/ML

## 2023-05-24 PROCEDURE — 83036 HEMOGLOBIN GLYCOSYLATED A1C: CPT | Performed by: NURSE PRACTITIONER

## 2023-05-24 PROCEDURE — 3044F HG A1C LEVEL LT 7.0%: CPT | Performed by: NURSE PRACTITIONER

## 2023-05-24 PROCEDURE — 99214 OFFICE O/P EST MOD 30 MIN: CPT | Performed by: NURSE PRACTITIONER

## 2023-05-24 PROCEDURE — 3078F DIAST BP <80 MM HG: CPT | Performed by: NURSE PRACTITIONER

## 2023-05-24 PROCEDURE — 3074F SYST BP LT 130 MM HG: CPT | Performed by: NURSE PRACTITIONER

## 2023-05-24 RX ORDER — ALBUTEROL SULFATE 90 UG/1
2 AEROSOL, METERED RESPIRATORY (INHALATION) EVERY 6 HOURS PRN
Qty: 18 G | Refills: 3 | Status: SHIPPED | OUTPATIENT
Start: 2023-05-24

## 2023-05-24 SDOH — ECONOMIC STABILITY: HOUSING INSECURITY
IN THE LAST 12 MONTHS, WAS THERE A TIME WHEN YOU DID NOT HAVE A STEADY PLACE TO SLEEP OR SLEPT IN A SHELTER (INCLUDING NOW)?: NO

## 2023-05-24 SDOH — ECONOMIC STABILITY: FOOD INSECURITY: WITHIN THE PAST 12 MONTHS, THE FOOD YOU BOUGHT JUST DIDN'T LAST AND YOU DIDN'T HAVE MONEY TO GET MORE.: NEVER TRUE

## 2023-05-24 SDOH — ECONOMIC STABILITY: INCOME INSECURITY: HOW HARD IS IT FOR YOU TO PAY FOR THE VERY BASICS LIKE FOOD, HOUSING, MEDICAL CARE, AND HEATING?: NOT HARD AT ALL

## 2023-05-24 SDOH — ECONOMIC STABILITY: FOOD INSECURITY: WITHIN THE PAST 12 MONTHS, YOU WORRIED THAT YOUR FOOD WOULD RUN OUT BEFORE YOU GOT MONEY TO BUY MORE.: NEVER TRUE

## 2023-05-24 ASSESSMENT — PATIENT HEALTH QUESTIONNAIRE - PHQ9
SUM OF ALL RESPONSES TO PHQ9 QUESTIONS 1 & 2: 0
SUM OF ALL RESPONSES TO PHQ QUESTIONS 1-9: 0
1. LITTLE INTEREST OR PLEASURE IN DOING THINGS: 0
SUM OF ALL RESPONSES TO PHQ QUESTIONS 1-9: 0
2. FEELING DOWN, DEPRESSED OR HOPELESS: 0

## 2023-05-24 ASSESSMENT — ENCOUNTER SYMPTOMS
RHINORRHEA: 0
DIFFICULTY BREATHING: 1
SHORTNESS OF BREATH: 1
CHEST TIGHTNESS: 1
WHEEZING: 1
SPUTUM PRODUCTION: 1
COUGH: 1

## 2023-05-24 NOTE — PROGRESS NOTES
Martha Carrizales (:  1970) is a 50 y.o. male,Established patient, here for evaluation of the following chief complaint(s):  Breathing Problem (States taking an allergy pill in the morning and it helps a little but he is worried about COPD. ) and Health Maintenance (Colonoscopy- [facility] will get records. )         ASSESSMENT/PLAN:  1. Type 2 diabetes mellitus without complication, without long-term current use of insulin (HCC)  -     POCT glycosylated hemoglobin (Hb A1C)  2. Shortness of breath  -     Full PFT Study With Bronchodilator; Future  -     XR CHEST (2 VW); Future  -     albuterol sulfate HFA (PROVENTIL HFA) 108 (90 Base) MCG/ACT inhaler; Inhale 2 puffs into the lungs every 6 hours as needed for Wheezing, Disp-18 g, R-3Normal  3. Other fatigue  -     Testosterone, Free; Future  -     CBC with Auto Differential; Future  -     Lyme Disease, Western Blot; Future  4. Vitamin D deficiency  -     Vitamin D 25 Hydroxy; Future  5. Screening for prostate cancer  -     PSA Screening; Future  6. Arthralgia, unspecified joint  -     Lyme Disease, Western Blot; Future      DM: Stable. Continue metformin 500mg daily. Fatigue: Rx for labs - follow up pending results. Shortness of breath: Rx for albuterol with instruction for use. Rx for PFT, chest XR - follow up pending results. Arthralgia: Rx for western blot due to hx of tick bite with erythema migrans. Follow up pending results.     Return in about 3 months (around 2023) for physical.         Subjective   SUBJECTIVE/OBJECTIVE:  Presents for acute visit with several concerns    Stopped smoking marijuana several months ago  Endorses some wheezing, shortness of breath since then with exercise  Does bring up thick white sputum on occasion   Is a , breathes in a lot of dust/material through the day   Started taking allergy pill - this did help some of the symptoms     DM: A1C 5.5 to 5.8  Fasting glucose 110   Taking metformin daily  Has not

## 2023-06-20 ENCOUNTER — HOSPITAL ENCOUNTER (OUTPATIENT)
Dept: GENERAL RADIOLOGY | Facility: CLINIC | Age: 53
Discharge: HOME | End: 2023-06-22
Payer: COMMERCIAL

## 2023-06-20 ENCOUNTER — HOSPITAL ENCOUNTER (OUTPATIENT)
Facility: CLINIC | Age: 53
Discharge: HOME | End: 2023-06-22
Payer: COMMERCIAL

## 2023-06-20 DIAGNOSIS — R06.02 SHORTNESS OF BREATH: ICD-10-CM

## 2023-06-20 PROCEDURE — 71046 X-RAY EXAM CHEST 2 VIEWS: CPT

## 2023-07-15 DIAGNOSIS — E11.9 TYPE 2 DIABETES MELLITUS WITHOUT COMPLICATION, WITHOUT LONG-TERM CURRENT USE OF INSULIN (HCC): ICD-10-CM

## 2023-07-17 NOTE — TELEPHONE ENCOUNTER
Last visit: 05/24/2023  Last Med refill: 05/16/2023  Does patient have enough medication for 72 hours: No:     Next Visit Date:  Future Appointments   Date Time Provider [address]   8/24/2023  3:00 PM LEXY Reynoso - NP [address] Maintenance   Topic Date Due    Pneumococcal 0-64 years Vaccine (1 - PCV) Never done    Diabetic foot exam  Never done    Diabetic Alb to Cr ratio (uACR) test  Never done    Diabetic retinal exam  Never done    Hepatitis B vaccine (1 of 3 - Risk 3-dose series) Never done    DTaP/Tdap/Td vaccine (1 - Tdap) Never done    COVID-19 Vaccine (2 - Booster for Brian series) 07/09/2021    Shingles vaccine (1 of 2) Never done    Flu vaccine (1) 08/01/2023    Lipids  12/19/2023    GFR test (Diabetes, CKD 3-4, OR last GFR 15-59)  12/19/2023    A1C test (Diabetic or Prediabetic)  05/24/2024    Depression Screen  05/24/2024    Colorectal Cancer Screen  06/27/2024    Hepatitis C screen  Completed    HIV screen  Completed    Hepatitis A vaccine  Aged Out    Hib vaccine  Aged Out    Meningococcal (ACWY) vaccine  Aged Out    Diabetes screen  Discontinued       Hemoglobin A1C (%)   Date Value   05/24/2023 5.8   08/29/2022 5.5   01/31/2022 5.5             ( goal A1C is < 7)   No results found for: LABMICR  LDL Cholesterol (mg/dL)   Date Value   12/19/2022 60   01/31/2022 86       (goal LDL is <100)   AST (U/L)   Date Value   12/19/2022 21     ALT (U/L)   Date Value   12/19/2022 27     BUN (mg/dL)   Date Value   12/19/2022 19     BP Readings from Last 3 Encounters:   05/24/23 118/72   08/29/22 116/72   02/28/22 132/82          (goal 120/80)    All Future Testing planned in [facility]  Lab Frequency Next Occurrence   Microalbumin, Ur Once 08/29/2022   Testosterone, Free Once 05/24/2023   CBC with Auto Differential Once 05/24/2023   PSA Screening Once 05/24/2023   Vitamin D 25 Hydroxy Once 05/24/2023   Full PFT Study With Bronchodilator Once 05/24/2023   Lyme Disease, Western

## 2023-08-24 ENCOUNTER — OFFICE VISIT (OUTPATIENT)
Dept: FAMILY MEDICINE CLINIC | Age: 53
End: 2023-08-24
Payer: COMMERCIAL

## 2023-08-24 VITALS
DIASTOLIC BLOOD PRESSURE: 72 MMHG | WEIGHT: 209 LBS | RESPIRATION RATE: 20 BRPM | HEIGHT: 69 IN | OXYGEN SATURATION: 98 % | HEART RATE: 78 BPM | BODY MASS INDEX: 30.96 KG/M2 | SYSTOLIC BLOOD PRESSURE: 120 MMHG

## 2023-08-24 DIAGNOSIS — R79.89 LOW SERUM TESTOSTERONE LEVEL IN MALE: ICD-10-CM

## 2023-08-24 DIAGNOSIS — E11.9 TYPE 2 DIABETES MELLITUS WITHOUT COMPLICATION, WITHOUT LONG-TERM CURRENT USE OF INSULIN (HCC): Primary | ICD-10-CM

## 2023-08-24 DIAGNOSIS — R06.02 SHORTNESS OF BREATH: ICD-10-CM

## 2023-08-24 DIAGNOSIS — M25.50 ARTHRALGIA, UNSPECIFIED JOINT: ICD-10-CM

## 2023-08-24 LAB — HBA1C MFR BLD: 5.6 %

## 2023-08-24 PROCEDURE — 99214 OFFICE O/P EST MOD 30 MIN: CPT | Performed by: NURSE PRACTITIONER

## 2023-08-24 PROCEDURE — 3044F HG A1C LEVEL LT 7.0%: CPT | Performed by: NURSE PRACTITIONER

## 2023-08-24 PROCEDURE — 3074F SYST BP LT 130 MM HG: CPT | Performed by: NURSE PRACTITIONER

## 2023-08-24 PROCEDURE — 83036 HEMOGLOBIN GLYCOSYLATED A1C: CPT | Performed by: NURSE PRACTITIONER

## 2023-08-24 PROCEDURE — 3078F DIAST BP <80 MM HG: CPT | Performed by: NURSE PRACTITIONER

## 2023-08-24 RX ORDER — IBUPROFEN 800 MG/1
800 TABLET ORAL EVERY 8 HOURS PRN
Qty: 20 TABLET | Refills: 0 | Status: SHIPPED | OUTPATIENT
Start: 2023-08-24 | End: 2027-10-20

## 2023-08-24 RX ORDER — ALBUTEROL SULFATE 90 UG/1
2 AEROSOL, METERED RESPIRATORY (INHALATION) EVERY 6 HOURS PRN
Qty: 18 G | Refills: 3 | Status: SHIPPED | OUTPATIENT
Start: 2023-08-24

## 2023-08-24 ASSESSMENT — ENCOUNTER SYMPTOMS
VOMITING: 0
SINUS PAIN: 0
BACK PAIN: 0
COLOR CHANGE: 0
DIARRHEA: 0
SINUS PRESSURE: 0
CHEST TIGHTNESS: 0
NAUSEA: 0
EYE PAIN: 0
ABDOMINAL PAIN: 0
SHORTNESS OF BREATH: 0
BLURRED VISION: 1
CONSTIPATION: 0

## 2023-11-12 DIAGNOSIS — E78.5 HYPERLIPIDEMIA, UNSPECIFIED HYPERLIPIDEMIA TYPE: ICD-10-CM

## 2023-11-12 DIAGNOSIS — I10 HYPERTENSION, UNSPECIFIED TYPE: ICD-10-CM

## 2023-11-13 RX ORDER — ROSUVASTATIN CALCIUM 10 MG/1
10 TABLET, COATED ORAL DAILY
Qty: 90 TABLET | Refills: 1 | Status: SHIPPED | OUTPATIENT
Start: 2023-11-13

## 2023-11-13 RX ORDER — LISINOPRIL 10 MG/1
10 TABLET ORAL DAILY
Qty: 90 TABLET | Refills: 1 | Status: SHIPPED | OUTPATIENT
Start: 2023-11-13

## 2023-11-13 NOTE — TELEPHONE ENCOUNTER
Yelena Estrada is calling to request a refill on the following medication(s):    Medication Request:  Requested Prescriptions     Pending Prescriptions Disp Refills    lisinopril (PRINIVIL;ZESTRIL) 10 MG tablet [Pharmacy Med Name: LISINOPRIL 10 MG TABLET] 90 tablet 1     Sig: TAKE 1 TABLET BY MOUTH DAILY    rosuvastatin (CRESTOR) 10 MG tablet [Pharmacy Med Name: ROSUVASTATIN CALCIUM 10 MG TAB] 90 tablet 1     Sig: TAKE 1 TABLET BY MOUTH DAILY       Last Visit Date (If Applicable):  6/45/0862    Next Visit Date:    2/29/2024

## 2024-02-10 DIAGNOSIS — E11.9 TYPE 2 DIABETES MELLITUS WITHOUT COMPLICATION, WITHOUT LONG-TERM CURRENT USE OF INSULIN (HCC): ICD-10-CM

## 2024-02-12 NOTE — TELEPHONE ENCOUNTER
Last visit: 08/24/2023  Last Med refill: 11/26/2023  Does patient have enough medication for 72 hours: No:     Next Visit Date:  Future Appointments   Date Time Provider Department Center   2/29/2024  2:30 PM Luthy, Lavonne, APRN - NP [id number]       Health Maintenance   Topic Date Due    Diabetic foot exam  Never done    Diabetic Alb to Cr ratio (uACR) test  Never done    Diabetic retinal exam  Never done    DTaP/Tdap/Td vaccine (1 - Tdap) Never done    COVID-19 Vaccine (2 - 2023-24 season) 09/01/2023    Lipids  12/19/2023    GFR test (Diabetes, CKD 3-4, OR last GFR 15-59)  12/19/2023    Hepatitis B vaccine (1 of 3 - 3-dose series) 08/24/2024 (Originally 1970)    Flu vaccine (1) 08/24/2024 (Originally 8/1/2023)    Shingles vaccine (1 of 2) 08/24/2024 (Originally 9/7/2022)    Pneumococcal 0-64 years Vaccine (1 - PCV) 08/24/2024 (Originally 9/7/1978)    Depression Screen  05/24/2024    Colorectal Cancer Screen  06/27/2024    A1C test (Diabetic or Prediabetic)  08/24/2024    Hepatitis C screen  Completed    HIV screen  Completed    Hepatitis A vaccine  Aged Out    Hib vaccine  Aged Out    Polio vaccine  Aged Out    Meningococcal (ACWY) vaccine  Aged Out    Diabetes screen  Discontinued       Hemoglobin A1C (%)   Date Value   08/24/2023 5.6   05/24/2023 5.8   08/29/2022 5.5             ( goal A1C is < 7)   No components found for: \"LABMICR\"  LDL Cholesterol (mg/dL)   Date Value   12/19/2022 60   01/31/2022 86       (goal LDL is <100)   AST (U/L)   Date Value   12/19/2022 21     ALT (U/L)   Date Value   12/19/2022 27     BUN (mg/dL)   Date Value   12/19/2022 19     BP Readings from Last 3 Encounters:   08/24/23 120/72   05/24/23 118/72   08/29/22 116/72          (goal 120/80)    All Future Testing planned in [facility]  Lab Frequency Next Occurrence   Testosterone, Free Once 05/24/2023   CBC with Auto Differential Once 05/24/2023   PSA Screening Once 05/24/2023   Vitamin D 25 Hydroxy Once 05/24/2023   Full

## 2024-02-14 DIAGNOSIS — M25.50 ARTHRALGIA, UNSPECIFIED JOINT: ICD-10-CM

## 2024-02-14 DIAGNOSIS — R53.83 OTHER FATIGUE: ICD-10-CM

## 2024-02-14 DIAGNOSIS — Z12.5 SCREENING FOR PROSTATE CANCER: ICD-10-CM

## 2024-02-14 DIAGNOSIS — E55.9 VITAMIN D DEFICIENCY: ICD-10-CM

## 2024-05-14 DIAGNOSIS — E78.5 HYPERLIPIDEMIA, UNSPECIFIED HYPERLIPIDEMIA TYPE: ICD-10-CM

## 2024-05-14 DIAGNOSIS — E11.9 TYPE 2 DIABETES MELLITUS WITHOUT COMPLICATION, WITHOUT LONG-TERM CURRENT USE OF INSULIN (HCC): ICD-10-CM

## 2024-05-14 NOTE — TELEPHONE ENCOUNTER
LEFT MESSAGE FOR PATIENT TO CALL THE OFFICE TO SCHEDULE AN APPT, NO  MY CHART SET UP    Last visit: 08/24/23  Last Med refill: 03/11/24 & 02/10/24  Does patient have enough medication for 72 hours: yes and no    Next Visit Date:  No future appointments.    Health Maintenance   Topic Date Due    Diabetic foot exam  Never done    Diabetic Alb to Cr ratio (uACR) test  Never done    Diabetic retinal exam  Never done    DTaP/Tdap/Td vaccine (1 - Tdap) Never done    COVID-19 Vaccine (2 - 2023-24 season) 09/01/2023    Lipids  12/19/2023    GFR test (Diabetes, CKD 3-4, OR last GFR 15-59)  12/19/2023    Depression Screen  05/24/2024    Hepatitis B vaccine (1 of 3 - 3-dose series) 08/24/2024 (Originally 1970)    Flu vaccine (Season Ended) 08/24/2024 (Originally 8/1/2024)    Shingles vaccine (1 of 2) 08/24/2024 (Originally 9/7/2022)    Pneumococcal 0-64 years Vaccine (1 of 2 - PCV) 08/24/2024 (Originally 9/7/1978)    Colorectal Cancer Screen  06/27/2024    A1C test (Diabetic or Prediabetic)  08/24/2024    Hepatitis C screen  Completed    HIV screen  Completed    Hepatitis A vaccine  Aged Out    Hib vaccine  Aged Out    Polio vaccine  Aged Out    Meningococcal (ACWY) vaccine  Aged Out    Diabetes screen  Discontinued       Hemoglobin A1C (%)   Date Value   08/24/2023 5.6   05/24/2023 5.8   08/29/2022 5.5             ( goal A1C is < 7)   No components found for: \"LABMICR\"  No components found for: \"LDLCHOLESTEROL\", \"LDLCALC\"    (goal LDL is <100)   AST (U/L)   Date Value   12/19/2022 21     ALT (U/L)   Date Value   12/19/2022 27     BUN (mg/dL)   Date Value   12/19/2022 19     BP Readings from Last 3 Encounters:   08/24/23 120/72   05/24/23 118/72   08/29/22 116/72          (goal 120/80)    All Future Testing planned in [facility]  Lab Frequency Next Occurrence   Full PFT Study With Bronchodilator Once 05/24/2023               Patient Active Problem List:     Type 2 diabetes mellitus without complication, without long-term

## 2024-05-15 RX ORDER — ROSUVASTATIN CALCIUM 10 MG/1
10 TABLET, COATED ORAL DAILY
Qty: 30 TABLET | Refills: 0 | Status: SHIPPED | OUTPATIENT
Start: 2024-05-15

## 2024-06-14 DIAGNOSIS — E78.5 HYPERLIPIDEMIA, UNSPECIFIED HYPERLIPIDEMIA TYPE: ICD-10-CM

## 2024-06-14 DIAGNOSIS — I10 HYPERTENSION, UNSPECIFIED TYPE: ICD-10-CM

## 2024-06-14 RX ORDER — ROSUVASTATIN CALCIUM 10 MG/1
10 TABLET, COATED ORAL DAILY
Qty: 90 TABLET | Refills: 0 | Status: SHIPPED | OUTPATIENT
Start: 2024-06-14

## 2024-06-14 RX ORDER — ROSUVASTATIN CALCIUM 10 MG/1
10 TABLET, COATED ORAL DAILY
Qty: 30 TABLET | OUTPATIENT
Start: 2024-06-14

## 2024-06-14 RX ORDER — LISINOPRIL 10 MG/1
10 TABLET ORAL DAILY
Qty: 90 TABLET | Refills: 0 | Status: SHIPPED | OUTPATIENT
Start: 2024-06-14

## 2024-06-14 NOTE — TELEPHONE ENCOUNTER
Last visit: 08/24/2023  Last Med refill: 05/15/2024  Does patient have enough medication for 72 hours: No: 30 day supply only.     Next Visit Date:  No future appointments.    Health Maintenance   Topic Date Due    Diabetic foot exam  Never done    Diabetic Alb to Cr ratio (uACR) test  Never done    Diabetic retinal exam  Never done    DTaP/Tdap/Td vaccine (1 - Tdap) Never done    COVID-19 Vaccine (2 - 2023-24 season) 09/01/2023    Lipids  12/19/2023    GFR test (Diabetes, CKD 3-4, OR last GFR 15-59)  12/19/2023    Depression Screen  05/24/2024    Colorectal Cancer Screen  06/27/2024    Hepatitis B vaccine (1 of 3 - 3-dose series) 08/24/2024 (Originally 1970)    Flu vaccine (Season Ended) 08/24/2024 (Originally 8/1/2024)    Shingles vaccine (1 of 2) 08/24/2024 (Originally 9/7/2022)    Pneumococcal 0-64 years Vaccine (1 of 2 - PCV) 08/24/2024 (Originally 9/7/1978)    A1C test (Diabetic or Prediabetic)  08/24/2024    Hepatitis C screen  Completed    HIV screen  Completed    Hepatitis A vaccine  Aged Out    Hib vaccine  Aged Out    Polio vaccine  Aged Out    Meningococcal (ACWY) vaccine  Aged Out    Diabetes screen  Discontinued       Hemoglobin A1C (%)   Date Value   08/24/2023 5.6   05/24/2023 5.8   08/29/2022 5.5             ( goal A1C is < 7)   No components found for: \"LABMICR\"  No components found for: \"LDLCHOLESTEROL\", \"LDLCALC\"    (goal LDL is <100)   AST (U/L)   Date Value   12/19/2022 21     ALT (U/L)   Date Value   12/19/2022 27     BUN (mg/dL)   Date Value   12/19/2022 19     BP Readings from Last 3 Encounters:   08/24/23 120/72   05/24/23 118/72   08/29/22 116/72          (goal 120/80)    All Future Testing planned in [facility]  Lab Frequency Next Occurrence               Patient Active Problem List:     Type 2 diabetes mellitus without complication, without long-term current use of insulin (HCC)     Marijuana use     Hypertension     Gastroesophageal reflux disease without esophagitis

## 2024-07-13 DIAGNOSIS — E11.9 TYPE 2 DIABETES MELLITUS WITHOUT COMPLICATION, WITHOUT LONG-TERM CURRENT USE OF INSULIN (HCC): ICD-10-CM

## 2024-07-30 DIAGNOSIS — E11.9 TYPE 2 DIABETES MELLITUS WITHOUT COMPLICATION, WITHOUT LONG-TERM CURRENT USE OF INSULIN (HCC): ICD-10-CM

## 2024-07-31 NOTE — TELEPHONE ENCOUNTER
Last visit: 08/24/2023  Last Med refill: 03/21/2024  Does patient have enough medication for 72 hours: No:     Next Visit Date:  No future appointments.    Health Maintenance   Topic Date Due    Diabetic foot exam  Never done    Diabetic Alb to Cr ratio (uACR) test  Never done    Diabetic retinal exam  Never done    DTaP/Tdap/Td vaccine (1 - Tdap) Never done    COVID-19 Vaccine (2 - 2023-24 season) 09/01/2023    Lipids  12/19/2023    GFR test (Diabetes, CKD 3-4, OR last GFR 15-59)  12/19/2023    Depression Screen  05/24/2024    Colorectal Cancer Screen  06/27/2024    A1C test (Diabetic or Prediabetic)  08/24/2024    Hepatitis B vaccine (1 of 3 - 3-dose series) 08/24/2024 (Originally 1970)    Shingles vaccine (1 of 2) 08/24/2024 (Originally 9/7/2022)    Pneumococcal 0-64 years Vaccine (1 of 2 - PCV) 08/24/2024 (Originally 9/7/1978)    Flu vaccine (1) 08/01/2024    Hepatitis C screen  Completed    HIV screen  Completed    Hepatitis A vaccine  Aged Out    Hib vaccine  Aged Out    Polio vaccine  Aged Out    Meningococcal (ACWY) vaccine  Aged Out    Diabetes screen  Discontinued       Hemoglobin A1C (%)   Date Value   08/24/2023 5.6   05/24/2023 5.8   08/29/2022 5.5             ( goal A1C is < 7)   No components found for: \"LABMICR\"  No components found for: \"LDLCHOLESTEROL\", \"LDLCALC\"    (goal LDL is <100)   AST (U/L)   Date Value   12/19/2022 21     ALT (U/L)   Date Value   12/19/2022 27     BUN (mg/dL)   Date Value   12/19/2022 19     BP Readings from Last 3 Encounters:   08/24/23 120/72   05/24/23 118/72   08/29/22 116/72          (goal 120/80)    All Future Testing planned in [facility]  Lab Frequency Next Occurrence               Patient Active Problem List:     Type 2 diabetes mellitus without complication, without long-term current use of insulin (HCC)     Marijuana use     Hypertension     Gastroesophageal reflux disease without esophagitis

## 2024-08-05 ENCOUNTER — OFFICE VISIT (OUTPATIENT)
Dept: FAMILY MEDICINE CLINIC | Age: 54
End: 2024-08-05
Payer: COMMERCIAL

## 2024-08-05 VITALS
DIASTOLIC BLOOD PRESSURE: 86 MMHG | HEART RATE: 63 BPM | OXYGEN SATURATION: 97 % | RESPIRATION RATE: 18 BRPM | BODY MASS INDEX: 30.13 KG/M2 | SYSTOLIC BLOOD PRESSURE: 136 MMHG | WEIGHT: 203.4 LBS | HEIGHT: 69 IN

## 2024-08-05 DIAGNOSIS — Z12.5 SCREENING FOR PROSTATE CANCER: ICD-10-CM

## 2024-08-05 DIAGNOSIS — E78.5 HYPERLIPIDEMIA, UNSPECIFIED HYPERLIPIDEMIA TYPE: ICD-10-CM

## 2024-08-05 DIAGNOSIS — I10 HYPERTENSION, UNSPECIFIED TYPE: ICD-10-CM

## 2024-08-05 DIAGNOSIS — Z86.19 HISTORY OF COLD SORES: ICD-10-CM

## 2024-08-05 DIAGNOSIS — E11.9 TYPE 2 DIABETES MELLITUS WITHOUT COMPLICATION, WITHOUT LONG-TERM CURRENT USE OF INSULIN (HCC): Primary | ICD-10-CM

## 2024-08-05 LAB — HBA1C MFR BLD: 5.9 %

## 2024-08-05 PROCEDURE — 83036 HEMOGLOBIN GLYCOSYLATED A1C: CPT | Performed by: NURSE PRACTITIONER

## 2024-08-05 PROCEDURE — 3075F SYST BP GE 130 - 139MM HG: CPT | Performed by: NURSE PRACTITIONER

## 2024-08-05 PROCEDURE — 3079F DIAST BP 80-89 MM HG: CPT | Performed by: NURSE PRACTITIONER

## 2024-08-05 PROCEDURE — 99214 OFFICE O/P EST MOD 30 MIN: CPT | Performed by: NURSE PRACTITIONER

## 2024-08-05 PROCEDURE — 3044F HG A1C LEVEL LT 7.0%: CPT | Performed by: NURSE PRACTITIONER

## 2024-08-05 RX ORDER — VALSARTAN 40 MG/1
40 TABLET ORAL DAILY
Qty: 30 TABLET | Refills: 1 | Status: SHIPPED | OUTPATIENT
Start: 2024-08-05

## 2024-08-05 RX ORDER — VALACYCLOVIR HYDROCHLORIDE 1 G/1
1000 TABLET, FILM COATED ORAL 2 TIMES DAILY
Qty: 14 TABLET | Refills: 0 | Status: SHIPPED | OUTPATIENT
Start: 2024-08-05 | End: 2024-08-12

## 2024-08-05 SDOH — ECONOMIC STABILITY: INCOME INSECURITY: HOW HARD IS IT FOR YOU TO PAY FOR THE VERY BASICS LIKE FOOD, HOUSING, MEDICAL CARE, AND HEATING?: NOT HARD AT ALL

## 2024-08-05 SDOH — ECONOMIC STABILITY: FOOD INSECURITY: WITHIN THE PAST 12 MONTHS, YOU WORRIED THAT YOUR FOOD WOULD RUN OUT BEFORE YOU GOT MONEY TO BUY MORE.: NEVER TRUE

## 2024-08-05 SDOH — ECONOMIC STABILITY: FOOD INSECURITY: WITHIN THE PAST 12 MONTHS, THE FOOD YOU BOUGHT JUST DIDN'T LAST AND YOU DIDN'T HAVE MONEY TO GET MORE.: NEVER TRUE

## 2024-08-05 ASSESSMENT — ENCOUNTER SYMPTOMS
SINUS PRESSURE: 0
ABDOMINAL PAIN: 0
BACK PAIN: 0
NAUSEA: 0
COLOR CHANGE: 0
SINUS PAIN: 0
CONSTIPATION: 0
VOMITING: 0
EYE PAIN: 0
DIARRHEA: 0
CHEST TIGHTNESS: 0
SHORTNESS OF BREATH: 0

## 2024-08-05 ASSESSMENT — PATIENT HEALTH QUESTIONNAIRE - PHQ9
2. FEELING DOWN, DEPRESSED OR HOPELESS: NOT AT ALL
SUM OF ALL RESPONSES TO PHQ9 QUESTIONS 1 & 2: 0
SUM OF ALL RESPONSES TO PHQ QUESTIONS 1-9: 0
1. LITTLE INTEREST OR PLEASURE IN DOING THINGS: NOT AT ALL
SUM OF ALL RESPONSES TO PHQ QUESTIONS 1-9: 0

## 2024-08-05 NOTE — PROGRESS NOTES
Kyler Cochran (:  1970) is a 53 y.o. male,Established patient, here for evaluation of the following chief complaint(s):  Diabetes and Health Maintenance (Depression screen completed. /SDOH completed. /Labs ordered. /)      Assessment & Plan   1. Type 2 diabetes mellitus without complication, without long-term current use of insulin (HCC)  Comments:  Non compliant with OV follow ups. Condition stable. A1C 5.6 to 5.9, continue metformin 500mg daily  Orders:  -     Comprehensive Metabolic Panel; Future  -     Microalbumin, Ur  -     POCT glycosylated hemoglobin (Hb A1C)  -     valsartan (DIOVAN) 40 MG tablet; Take 1 tablet by mouth daily, Disp-30 tablet, R-1Normal  -     metFORMIN (GLUCOPHAGE) 500 MG tablet; Take 1 tablet by mouth daily, Disp-90 tablet, R-1Normal  2. Screening for prostate cancer  -     PSA Screening; Future  3. Hypertension, unspecified type  Comments:  BP borderline controlled. He stopped lisinopril d/t fatigue. Start diovan 40mg daily. Monitor BP.  Orders:  -     CBC with Auto Differential; Future  -     valsartan (DIOVAN) 40 MG tablet; Take 1 tablet by mouth daily, Disp-30 tablet, R-1Normal  4. Hyperlipidemia, unspecified hyperlipidemia type  -     Lipid Panel; Future  5. History of cold sores  -     valACYclovir (VALTREX) 1 g tablet; Take 1 tablet by mouth 2 times daily for 7 days, Disp-14 tablet, R-0Normal      Return in about 6 months (around 2025) for physical, Diabetes.       Subjective   Presents for follow up of chronic conditions   Last OV 2023    Stopped lisinopril and statin - felt like it was giving him brain fog, \"draining the life out of me\"  Started exercising daily   Cut back on drinking beer to just one or two days a week     Requesting refill of valtrex for history of oral cold sores. Get one to two times a year - med typically reduces outbreak to just a few days.     Refuses colonoscopy, he will re evaluate next year if he would like to update   Willing to

## 2024-10-01 DIAGNOSIS — E11.9 TYPE 2 DIABETES MELLITUS WITHOUT COMPLICATION, WITHOUT LONG-TERM CURRENT USE OF INSULIN (HCC): ICD-10-CM

## 2024-10-01 DIAGNOSIS — I10 HYPERTENSION, UNSPECIFIED TYPE: ICD-10-CM

## 2024-10-01 RX ORDER — VALSARTAN 40 MG/1
40 TABLET ORAL DAILY
Qty: 90 TABLET | Refills: 1 | Status: SHIPPED | OUTPATIENT
Start: 2024-10-01

## 2024-10-01 NOTE — TELEPHONE ENCOUNTER
Last visit: 8/5/24  Last Med refill: 8/5/24  Does patient have enough medication for 72 hours: Yes    Next Visit Date:  Future Appointments   Date Time Provider Department Center   2/6/2025  2:30 PM Lavonne Stanton APRN - NP [facility]       Health Maintenance   Topic Date Due    Pneumococcal 0-64 years Vaccine (1 of 2 - PCV) Never done    Diabetic foot exam  Never done    Diabetic Alb to Cr ratio (uACR) test  Never done    Diabetic retinal exam  Never done    Hepatitis B vaccine (1 of 3 - 19+ 3-dose series) Never done    DTaP/Tdap/Td vaccine (1 - Tdap) Never done    Shingles vaccine (1 of 2) Never done    Lipids  12/19/2023    GFR test (Diabetes, CKD 3-4, OR last GFR 15-59)  12/19/2023    Colorectal Cancer Screen  06/27/2024    COVID-19 Vaccine (2 - 2023-24 season) 09/01/2024    Flu vaccine (1) 08/05/2025 (Originally 8/1/2024)    A1C test (Diabetic or Prediabetic)  08/05/2025    Depression Screen  08/05/2025    Hepatitis C screen  Completed    HIV screen  Completed    Hepatitis A vaccine  Aged Out    Hib vaccine  Aged Out    Polio vaccine  Aged Out    Meningococcal (ACWY) vaccine  Aged Out    Diabetes screen  Discontinued       Hemoglobin A1C (%)   Date Value   08/05/2024 5.9   08/24/2023 5.6   05/24/2023 5.8             ( goal A1C is < 7)   No components found for: \"LABMICR\"  No components found for: \"LDLCHOLESTEROL\", \"LDLCALC\"    (goal LDL is <100)   AST (U/L)   Date Value   12/19/2022 21     ALT (U/L)   Date Value   12/19/2022 27     BUN (mg/dL)   Date Value   12/19/2022 19     BP Readings from Last 3 Encounters:   08/05/24 136/86   08/24/23 120/72   05/24/23 118/72          (goal 120/80)    All Future Testing planned in [facility]  Lab Frequency Next Occurrence   Lipid Panel Once 08/05/2024   Comprehensive Metabolic Panel Once 08/05/2024   CBC with Auto Differential Once 08/05/2024   PSA Screening Once 08/05/2024               Patient Active Problem List:     Type 2 diabetes mellitus without

## 2024-10-03 ENCOUNTER — TELEPHONE (OUTPATIENT)
Dept: FAMILY MEDICINE CLINIC | Age: 54
End: 2024-10-03

## 2024-10-03 DIAGNOSIS — N52.9 ERECTILE DYSFUNCTION, UNSPECIFIED ERECTILE DYSFUNCTION TYPE: Primary | ICD-10-CM

## 2024-10-03 RX ORDER — SILDENAFIL 50 MG/1
50 TABLET, FILM COATED ORAL PRN
Qty: 30 TABLET | Refills: 0 | Status: SHIPPED | OUTPATIENT
Start: 2024-10-03

## 2024-10-03 NOTE — TELEPHONE ENCOUNTER
Last visit: 8/5/24  Last Med refill: NEVER  Does patient have enough medication for 72 hours:     Patient is requesting a script for Viagra. He states has always been to embarrassed  to ask for in the past.  His next OV is not till 02/26/25       Next Visit Date:         Future Appointments   Date Time Provider Department Center   2/6/2025  2:30 PM Lavonne Stanton APRN - [facility]

## 2024-10-03 NOTE — TELEPHONE ENCOUNTER
Rx sent in.  If no improvement needs to follow up with previously given referral to urology for low testosterone levels. This may be contributing to issue.

## 2024-10-22 LAB
ALBUMIN: 4.6 G/DL
ALP BLD-CCNC: 43 U/L
ALT SERPL-CCNC: 24 U/L
ANION GAP SERPL CALCULATED.3IONS-SCNC: 10 MMOL/L
AST SERPL-CCNC: 18 U/L
BASOPHILS ABSOLUTE: 0 /ΜL
BASOPHILS RELATIVE PERCENT: 0.5 %
BILIRUB SERPL-MCNC: 0.4 MG/DL (ref 0.1–1.4)
BUN BLDV-MCNC: 16 MG/DL
CALCIUM SERPL-MCNC: 9.3 MG/DL
CHLORIDE BLD-SCNC: 102 MMOL/L
CHOLESTEROL, TOTAL: 161 MG/DL
CHOLESTEROL/HDL RATIO: 2.2
CO2: 27 MMOL/L
CREAT SERPL-MCNC: 0.8 MG/DL
EOSINOPHILS ABSOLUTE: 0.1 /ΜL
EOSINOPHILS RELATIVE PERCENT: 1.1 %
GFR, ESTIMATED: >90
GLUCOSE BLD-MCNC: 109 MG/DL
HCT VFR BLD CALC: 45.2 % (ref 41–53)
HDLC SERPL-MCNC: 72 MG/DL (ref 35–70)
HEMOGLOBIN: 15.3 G/DL (ref 13.5–17.5)
LDL CHOLESTEROL: 81
LYMPHOCYTES ABSOLUTE: 1.4 /ΜL
LYMPHOCYTES RELATIVE PERCENT: 19.5 %
MCH RBC QN AUTO: 29.9 PG
MCHC RBC AUTO-ENTMCNC: 33.8 G/DL
MCV RBC AUTO: 89 FL
MONOCYTES ABSOLUTE: 0.6 /ΜL
MONOCYTES RELATIVE PERCENT: 8.6 %
NEUTROPHILS ABSOLUTE: 5.1 /ΜL
NEUTROPHILS RELATIVE PERCENT: 70.3 %
NONHDLC SERPL-MCNC: ABNORMAL MG/DL
PDW BLD-RTO: 13.3 %
PLATELET # BLD: 183 K/ΜL
PMV BLD AUTO: 9.8 FL
POTASSIUM SERPL-SCNC: 4.4 MMOL/L
PROSTATE SPECIFIC ANTIGEN: 2.03 NG/ML
RBC # BLD: 5.11 10^6/ΜL
SODIUM BLD-SCNC: 139 MMOL/L
TOTAL PROTEIN: 7.2 G/DL (ref 6.4–8.2)
TRIGL SERPL-MCNC: 38 MG/DL
VLDLC SERPL CALC-MCNC: 8 MG/DL
WBC # BLD: 7.2 10^3/ML

## 2024-10-24 DIAGNOSIS — E78.5 HYPERLIPIDEMIA, UNSPECIFIED HYPERLIPIDEMIA TYPE: ICD-10-CM

## 2024-10-24 DIAGNOSIS — E11.9 TYPE 2 DIABETES MELLITUS WITHOUT COMPLICATION, WITHOUT LONG-TERM CURRENT USE OF INSULIN (HCC): ICD-10-CM

## 2024-10-24 DIAGNOSIS — Z12.5 SCREENING FOR PROSTATE CANCER: ICD-10-CM

## 2024-10-24 DIAGNOSIS — I10 HYPERTENSION, UNSPECIFIED TYPE: ICD-10-CM

## 2025-02-06 ENCOUNTER — OFFICE VISIT (OUTPATIENT)
Dept: FAMILY MEDICINE CLINIC | Age: 55
End: 2025-02-06
Payer: COMMERCIAL

## 2025-02-06 VITALS
WEIGHT: 185 LBS | DIASTOLIC BLOOD PRESSURE: 82 MMHG | OXYGEN SATURATION: 98 % | RESPIRATION RATE: 20 BRPM | HEIGHT: 69 IN | SYSTOLIC BLOOD PRESSURE: 138 MMHG | BODY MASS INDEX: 27.4 KG/M2 | HEART RATE: 83 BPM

## 2025-02-06 DIAGNOSIS — Z00.00 ENCOUNTER FOR WELL ADULT EXAM WITHOUT ABNORMAL FINDINGS: Primary | ICD-10-CM

## 2025-02-06 DIAGNOSIS — E11.9 TYPE 2 DIABETES MELLITUS WITHOUT COMPLICATION, WITHOUT LONG-TERM CURRENT USE OF INSULIN (HCC): ICD-10-CM

## 2025-02-06 DIAGNOSIS — N52.9 ERECTILE DYSFUNCTION, UNSPECIFIED ERECTILE DYSFUNCTION TYPE: ICD-10-CM

## 2025-02-06 DIAGNOSIS — I10 HYPERTENSION, UNSPECIFIED TYPE: ICD-10-CM

## 2025-02-06 DIAGNOSIS — Z12.11 SCREEN FOR COLON CANCER: ICD-10-CM

## 2025-02-06 LAB — HBA1C MFR BLD: 5.6 %

## 2025-02-06 PROCEDURE — 3075F SYST BP GE 130 - 139MM HG: CPT | Performed by: NURSE PRACTITIONER

## 2025-02-06 PROCEDURE — 99396 PREV VISIT EST AGE 40-64: CPT | Performed by: NURSE PRACTITIONER

## 2025-02-06 PROCEDURE — 83036 HEMOGLOBIN GLYCOSYLATED A1C: CPT | Performed by: NURSE PRACTITIONER

## 2025-02-06 PROCEDURE — 3079F DIAST BP 80-89 MM HG: CPT | Performed by: NURSE PRACTITIONER

## 2025-02-06 RX ORDER — TADALAFIL 10 MG/1
10 TABLET ORAL PRN
Qty: 30 TABLET | Refills: 0 | Status: SHIPPED | OUTPATIENT
Start: 2025-02-06

## 2025-02-06 RX ORDER — VALSARTAN 40 MG/1
40 TABLET ORAL DAILY
Qty: 90 TABLET | Refills: 1 | Status: CANCELLED | OUTPATIENT
Start: 2025-02-06

## 2025-02-06 SDOH — ECONOMIC STABILITY: FOOD INSECURITY: WITHIN THE PAST 12 MONTHS, THE FOOD YOU BOUGHT JUST DIDN'T LAST AND YOU DIDN'T HAVE MONEY TO GET MORE.: NEVER TRUE

## 2025-02-06 SDOH — ECONOMIC STABILITY: FOOD INSECURITY: WITHIN THE PAST 12 MONTHS, YOU WORRIED THAT YOUR FOOD WOULD RUN OUT BEFORE YOU GOT MONEY TO BUY MORE.: NEVER TRUE

## 2025-02-06 ASSESSMENT — ENCOUNTER SYMPTOMS
COLOR CHANGE: 0
CHEST TIGHTNESS: 0
EYE PAIN: 0
SINUS PRESSURE: 0
NAUSEA: 0
SHORTNESS OF BREATH: 0
CONSTIPATION: 0
ABDOMINAL PAIN: 0
BACK PAIN: 0
SINUS PAIN: 0
DIARRHEA: 0
VOMITING: 0

## 2025-02-06 ASSESSMENT — PATIENT HEALTH QUESTIONNAIRE - PHQ9
2. FEELING DOWN, DEPRESSED OR HOPELESS: NOT AT ALL
SUM OF ALL RESPONSES TO PHQ QUESTIONS 1-9: 0
SUM OF ALL RESPONSES TO PHQ9 QUESTIONS 1 & 2: 0
SUM OF ALL RESPONSES TO PHQ QUESTIONS 1-9: 0
1. LITTLE INTEREST OR PLEASURE IN DOING THINGS: NOT AT ALL

## 2025-02-06 NOTE — PROGRESS NOTES
Well Adult Note  Name: Kyler Cochran Today’s Date: 2025   MRN: [id number] Sex: Male   Age: 54 y.o. Ethnicity: Non- / Non    : 1970 Race: White (non-)      Kyler Cochran is here for a well adult exam.       Assessment & Plan   Encounter for well adult exam without abnormal findings  Type 2 diabetes mellitus without complication, without long-term current use of insulin ([facility])  Comments:  Chronic condition. Stable. A1C 5.6 today, continue current medication regimen.  Orders:  -     metFORMIN (GLUCOPHAGE) 500 MG tablet; Take 1 tablet by mouth daily, Disp-90 tablet, R-1Normal  - POCT A1C  - Microalbumin Ur  Hypertension, unspecified type  Initial BP elevated, recheck did come down. Unable to tolerate lisinopril or valsartan due to reported side effects of myalgia and fatigue. Advised to stop smoking marijuana. Continue checking BP at least 3 times a week. Call office if BP sustained greater than 140/90.   Erectile dysfunction, unspecified erectile dysfunction type  -     tadalafil (CIALIS) 10 MG tablet; Take 1 tablet by mouth as needed for Erectile Dysfunction, Disp-30 tablet, R-0Normal  Screen for colon cancer  -     [facility] - Farrukh Diallo DO, Gastroenterology, [place]        Return in 6 months (on 2025) for Diabetes, HTN.       Subjective   History:    Presents for annual exam    Going through divorce  Down 18lbs since last OV  Started intermittent fasting. Does not eat after 9pm, eats breakfast around 1030am - will break fast with healthier foods like tomato, fruits. Exercising a lot more.     HTN: Checks BP every morning. Typically 130/70s. Believes elevated BP is because he smoked marijuana prior to OV. Stopped BP meds because valsartan made him feel sore, stiff and ache.   Denies chest pain, shortness of breath, dizziness/lightheadedness, peripheral edema, heart palpitations     Re started smoking weed a few times a week to just relax    Review of Systems   Constitutional:

## 2025-04-02 ENCOUNTER — OFFICE VISIT (OUTPATIENT)
Dept: FAMILY MEDICINE CLINIC | Age: 55
End: 2025-04-02
Payer: COMMERCIAL

## 2025-04-02 VITALS
SYSTOLIC BLOOD PRESSURE: 136 MMHG | BODY MASS INDEX: 27.96 KG/M2 | HEIGHT: 68 IN | OXYGEN SATURATION: 100 % | RESPIRATION RATE: 20 BRPM | WEIGHT: 184.5 LBS | DIASTOLIC BLOOD PRESSURE: 84 MMHG | HEART RATE: 78 BPM

## 2025-04-02 DIAGNOSIS — R41.840 INATTENTION: Primary | ICD-10-CM

## 2025-04-02 PROCEDURE — 3075F SYST BP GE 130 - 139MM HG: CPT | Performed by: NURSE PRACTITIONER

## 2025-04-02 PROCEDURE — 99213 OFFICE O/P EST LOW 20 MIN: CPT | Performed by: NURSE PRACTITIONER

## 2025-04-02 PROCEDURE — 3079F DIAST BP 80-89 MM HG: CPT | Performed by: NURSE PRACTITIONER

## 2025-04-02 NOTE — PATIENT INSTRUCTIONS
[facility]   Address: [address], [place] [address]  Phone: [phone]

## 2025-04-02 NOTE — PROGRESS NOTES
Kyler Cochran (:  1970) is a 54 y.o. male,Established patient, here for evaluation of the following chief complaint(s):  ADHD (Would like to discuss potential ADHD. /Pt states he has ADHD but has never been on medication. /)     Assessment & Plan  Inattention  Last formal evaluation 29 years ago for ADHD symptoms. Because of this I do advise he return to psychiatry for testing to confirm diagnosis.  I do not advise he take medication not prescribed to him by a medical provider. Verbalized understanding of this.     Orders:    External Referral To Psychiatry      No follow-ups on file.       Subjective   Presents for acute visit to discuss ADHD    Diagnosed with ADHD at the age of 15, was never started on medication because mom could not afford it  Has struggled with inattention his entire life \"my brain never stops\"  Typically uses marijuana and alcohol to manage constant racing thoughts - wants to give this up, does not want to live depending on this forever.   Talked with his daughter who was diagnosed with ADHD and she shared a week worth of her medication to see how it would help him.   He told her he had the most amazing week. Was at peace the entire time, the day did not feel like it lasted 20 hours. Was able to focus and actually complete tasks.   Got triggered by his girlfriend and while he  knows his constant racing thoughts contribute to feeling on edge he almost could not stop himself from snapping. Attributes a lot of the above to his divorce because she was a good wife.   Feels like symptoms have worsened over the last 2 years.         Review of Systems   Psychiatric/Behavioral:  Positive for agitation and decreased concentration. Negative for self-injury and sleep disturbance.           Objective   Physical Exam  Vitals and nursing note reviewed.   Constitutional:       Appearance: Normal appearance.   HENT:      Head: Normocephalic.      Right Ear: Hearing normal.      Left Ear: Hearing